# Patient Record
Sex: MALE | Race: WHITE | NOT HISPANIC OR LATINO | URBAN - METROPOLITAN AREA
[De-identification: names, ages, dates, MRNs, and addresses within clinical notes are randomized per-mention and may not be internally consistent; named-entity substitution may affect disease eponyms.]

---

## 2018-10-22 ENCOUNTER — EMERGENCY (EMERGENCY)
Facility: HOSPITAL | Age: 33
LOS: 1 days | Discharge: HOME | End: 2018-10-22
Attending: EMERGENCY MEDICINE | Admitting: EMERGENCY MEDICINE
Payer: SELF-PAY

## 2018-10-22 VITALS
RESPIRATION RATE: 17 BRPM | HEART RATE: 67 BPM | SYSTOLIC BLOOD PRESSURE: 129 MMHG | DIASTOLIC BLOOD PRESSURE: 78 MMHG | TEMPERATURE: 98 F | OXYGEN SATURATION: 98 %

## 2018-10-22 VITALS
OXYGEN SATURATION: 97 % | HEART RATE: 79 BPM | TEMPERATURE: 98 F | SYSTOLIC BLOOD PRESSURE: 139 MMHG | DIASTOLIC BLOOD PRESSURE: 89 MMHG | WEIGHT: 160.06 LBS | RESPIRATION RATE: 18 BRPM

## 2018-10-22 DIAGNOSIS — B34.9 VIRAL INFECTION, UNSPECIFIED: ICD-10-CM

## 2018-10-22 DIAGNOSIS — R51 HEADACHE: ICD-10-CM

## 2018-10-22 DIAGNOSIS — R59.0 LOCALIZED ENLARGED LYMPH NODES: ICD-10-CM

## 2018-10-22 PROCEDURE — 85730 THROMBOPLASTIN TIME PARTIAL: CPT

## 2018-10-22 PROCEDURE — 87633 RESP VIRUS 12-25 TARGETS: CPT

## 2018-10-22 PROCEDURE — 87581 M.PNEUMON DNA AMP PROBE: CPT

## 2018-10-22 PROCEDURE — 87798 DETECT AGENT NOS DNA AMP: CPT

## 2018-10-22 PROCEDURE — 99284 EMERGENCY DEPT VISIT MOD MDM: CPT

## 2018-10-22 PROCEDURE — 87486 CHLMYD PNEUM DNA AMP PROBE: CPT

## 2018-10-22 PROCEDURE — 85610 PROTHROMBIN TIME: CPT

## 2018-10-22 PROCEDURE — 99283 EMERGENCY DEPT VISIT LOW MDM: CPT | Mod: 25

## 2018-10-22 PROCEDURE — 83605 ASSAY OF LACTIC ACID: CPT

## 2018-10-22 PROCEDURE — 80053 COMPREHEN METABOLIC PANEL: CPT

## 2018-10-22 PROCEDURE — 85025 COMPLETE CBC W/AUTO DIFF WBC: CPT

## 2018-10-22 RX ORDER — SODIUM CHLORIDE 9 MG/ML
1000 INJECTION INTRAMUSCULAR; INTRAVENOUS; SUBCUTANEOUS ONCE
Qty: 0 | Refills: 0 | Status: COMPLETED | OUTPATIENT
Start: 2018-10-22 | End: 2018-10-22

## 2018-10-22 RX ADMIN — SODIUM CHLORIDE 1000 MILLILITER(S): 9 INJECTION INTRAMUSCULAR; INTRAVENOUS; SUBCUTANEOUS at 10:12

## 2018-10-22 NOTE — ED ADULT TRIAGE NOTE - CHIEF COMPLAINT QUOTE
"I feel like my brain is burning and I have headache pressure since I was in [facility] on Tuesday.  My eyes started getting blurry, and I had the chills, nausea and neck stiffness.  My mom told me I might have meningitis."  Mask given.

## 2018-10-22 NOTE — ED ADULT NURSE NOTE - CHIEF COMPLAINT QUOTE
"I feel like my brain is burning and I have headache pressure since I was in [facility] on Tuesday.  My eyes started getting blurry, and I had the chills, nausea and neck stiffness.  My mom told me I might have meningitis."  Mask given.

## 2018-10-22 NOTE — ED PROVIDER NOTE - CONSTITUTIONAL, MLM
normal... Well appearing, well nourished, awake, alert, oriented to person, place, time/situation and in no apparent distress. no meningeal signs

## 2018-10-22 NOTE — ED ADULT NURSE NOTE - OBJECTIVE STATEMENT
Pt CO HA, visual changes, neck stiffness and nausea since Tuesday.  Pt states "I was in [place] at the time and it hasn't gone away and I'm concerned I have meningitis."  Pt denies vomiting, diarrhea, SOB, Fevers, CP and Dizziness at this time.

## 2018-10-22 NOTE — ED PROVIDER NOTE - OBJECTIVE STATEMENT
33 yo , reports intermittent headache for the last 5 days, currently no headache at all, reports first noticed in the middle of the night , pounding tight sensation at right side of head, on and off, generally resolved w ibuprofen, yesterday noticed

## 2018-10-22 NOTE — ED PROVIDER NOTE - MEDICAL DECISION MAKING DETAILS
pt w headaches, body aches, subjective fever and chills, no H/A in ER , no meningeal signs and nl neuro exam, pt non toxic appearing, lungs clear, abd soft N/T , discussed supportive care and discussed emergent return instructions , stable for d/c, workup reassuring.

## 2018-10-27 ENCOUNTER — EMERGENCY (EMERGENCY)
Facility: HOSPITAL | Age: 33
LOS: 1 days | Discharge: HOME | End: 2018-10-27
Attending: EMERGENCY MEDICINE | Admitting: EMERGENCY MEDICINE
Payer: SELF-PAY

## 2018-10-27 VITALS
RESPIRATION RATE: 18 BRPM | WEIGHT: 162.7 LBS | TEMPERATURE: 98 F | DIASTOLIC BLOOD PRESSURE: 85 MMHG | HEART RATE: 86 BPM | OXYGEN SATURATION: 98 % | SYSTOLIC BLOOD PRESSURE: 150 MMHG

## 2018-10-27 DIAGNOSIS — R11.0 NAUSEA: ICD-10-CM

## 2018-10-27 DIAGNOSIS — H53.8 OTHER VISUAL DISTURBANCES: ICD-10-CM

## 2018-10-27 DIAGNOSIS — R51 HEADACHE: ICD-10-CM

## 2018-10-27 DIAGNOSIS — L56.8 OTHER SPECIFIED ACUTE SKIN CHANGES DUE TO ULTRAVIOLET RADIATION: ICD-10-CM

## 2018-10-27 PROCEDURE — 99284 EMERGENCY DEPT VISIT MOD MDM: CPT

## 2018-10-27 PROCEDURE — 70450 CT HEAD/BRAIN W/O DYE: CPT

## 2018-10-27 PROCEDURE — 99284 EMERGENCY DEPT VISIT MOD MDM: CPT | Mod: 25

## 2018-10-27 PROCEDURE — 70450 CT HEAD/BRAIN W/O DYE: CPT | Mod: 26

## 2018-10-27 NOTE — ED ADULT NURSE NOTE - OBJECTIVE STATEMENT
32y M, A&ox3, presents to ed for eye pressure and headache snce last tuesday, worsening yesterday per pt. Pt reports was previously here for evaluation and told to come back for worsening symptoms. No facial droop, no slurring of speech, strength and sensation equal bilateral. 20/20 bilateral vision on snellen chart. Will continue to monitor.

## 2018-10-27 NOTE — ED PROVIDER NOTE - ATTENDING CONTRIBUTION TO CARE
31 y/o m with 2-3 weeks of intermittent headaches with associated blurry vision at the time of headache, photosensitivity, auditory sensitivities, nausea and feeling of HA coming on. + FH of headaches both mother and father. Pt state he was seen at ED on monday and labs/ RVP was done and was neg. He state pain meds improves pain. Denies fever, URI, head injury, dizziness, confusion, unsteadiness, paresis, paresthesia, no drug use, no h/o AVM or congenital abnormality. No n, v    PE well appearing  Neuro intact without focal deficits  Ct head neg in ED  ? migraine headache  Declines meds in ED  Neuro follow up arranged

## 2018-10-27 NOTE — ED PROVIDER NOTE - OBJECTIVE STATEMENT
33 y/o m with 2-3 weeks of intermittent headaches with associated blurry vision at the time of headache, photosensitivity, auditory sensitivities, nausea and feeling of HA coming on. + FH of headaches both mother and father. Pt state he was seen at ED on monday and labs/ RVP was done and was neg. He state pain meds improves pain. Denies fever, URI, head injury, dizziness, confusion, unsteadiness, paresis, paresthesia, no drug use, no h/o AVM or congenital abnormality. No n, v

## 2018-10-27 NOTE — ED PROVIDER NOTE - CHPI ED SYMPTOMS NEG
no dizziness/no fever/no vomiting/no confusion/no loss of consciousness/no nausea/no numbness/no weakness/no change in level of consciousness

## 2018-10-27 NOTE — ED PROVIDER NOTE - MEDICAL DECISION MAKING DETAILS
Patient with normal labs on Monday and normal ct scan of the head. Most likely a ocular headache with possible association of migraine HA. Recommend f/u with neurology

## 2018-10-27 NOTE — ED PROVIDER NOTE - PHYSICAL EXAMINATION
Normal muscle strength 5/5 , steady gait , coordination and balance intact. No focal motor or sensory deficit.

## 2018-10-27 NOTE — ED ADULT TRIAGE NOTE - CHIEF COMPLAINT QUOTE
" I have blurry vision and pressure behind my eyes since yesterday, I was here on monday and they sent me home".

## 2018-10-29 ENCOUNTER — APPOINTMENT (OUTPATIENT)
Dept: NEUROLOGY | Facility: CLINIC | Age: 33
End: 2018-10-29
Payer: SELF-PAY

## 2018-10-29 VITALS — DIASTOLIC BLOOD PRESSURE: 84 MMHG | OXYGEN SATURATION: 96 % | HEART RATE: 62 BPM | SYSTOLIC BLOOD PRESSURE: 138 MMHG

## 2018-10-29 DIAGNOSIS — G44.53 PRIMARY THUNDERCLAP HEADACHE: ICD-10-CM

## 2018-10-29 PROBLEM — Z00.00 ENCOUNTER FOR PREVENTIVE HEALTH EXAMINATION: Status: ACTIVE | Noted: 2018-10-29

## 2018-10-29 PROCEDURE — 99202 OFFICE O/P NEW SF 15 MIN: CPT

## 2018-11-06 ENCOUNTER — APPOINTMENT (OUTPATIENT)
Dept: MRI IMAGING | Facility: CLINIC | Age: 33
End: 2018-11-06